# Patient Record
Sex: FEMALE | Race: WHITE | NOT HISPANIC OR LATINO | Employment: FULL TIME | URBAN - METROPOLITAN AREA
[De-identification: names, ages, dates, MRNs, and addresses within clinical notes are randomized per-mention and may not be internally consistent; named-entity substitution may affect disease eponyms.]

---

## 2017-01-11 ENCOUNTER — HOSPITAL ENCOUNTER (OUTPATIENT)
Dept: FAMILY MEDICINE CLINIC | Facility: CLINIC | Age: 27
Setting detail: SPECIMEN
Discharge: HOME | End: 2017-01-11
Attending: NURSE PRACTITIONER | Admitting: NURSE PRACTITIONER

## 2017-01-11 LAB
ALBUMIN SERPL-MCNC: 3.6 G/DL (ref 3.5–4.8)
ALBUMIN/GLOB SERPL: 1.3 {RATIO} (ref 1–1.7)
ALP SERPL-CCNC: 73 IU/L (ref 32–91)
ALT SERPL-CCNC: 14 IU/L (ref 14–54)
ANION GAP SERPL CALC-SCNC: 12.2 MMOL/L (ref 10–20)
AST SERPL-CCNC: 17 IU/L (ref 15–41)
BILIRUB SERPL-MCNC: 0.3 MG/DL (ref 0.3–1.2)
BUN SERPL-MCNC: 9 MG/DL (ref 8–20)
BUN/CREAT SERPL: 12.9 (ref 5.4–26.2)
CALCIUM SERPL-MCNC: 8.9 MG/DL (ref 8.9–10.3)
CHLORIDE SERPL-SCNC: 108 MMOL/L (ref 101–111)
CHOLEST SERPL-MCNC: 127 MG/DL
CHOLEST/HDLC SERPL: 2.5 {RATIO}
CONV CO2: 22 MMOL/L (ref 22–32)
CONV LDL CHOLESTEROL DIRECT: 67 MG/DL (ref 0–100)
CONV TOTAL PROTEIN: 6.4 G/DL (ref 6.1–7.9)
CREAT UR-MCNC: 0.7 MG/DL (ref 0.4–1)
GLOBULIN UR ELPH-MCNC: 2.8 G/DL (ref 2.5–3.8)
GLUCOSE SERPL-MCNC: 92 MG/DL (ref 65–99)
HDLC SERPL-MCNC: 50 MG/DL
LDLC/HDLC SERPL: 1.3 {RATIO}
LIPID INTERPRETATION: NORMAL
POTASSIUM SERPL-SCNC: 4.2 MMOL/L (ref 3.6–5.1)
SODIUM SERPL-SCNC: 138 MMOL/L (ref 136–144)
T4 FREE SERPL-MCNC: 0.71 NG/DL (ref 0.58–1.64)
TRIGL SERPL-MCNC: 49 MG/DL
TSH SERPL-ACNC: 1.72 UIU/ML (ref 0.34–5.6)
VLDLC SERPL CALC-MCNC: 10 MG/DL

## 2018-11-07 ENCOUNTER — HOSPITAL ENCOUNTER (OUTPATIENT)
Dept: FAMILY MEDICINE CLINIC | Facility: CLINIC | Age: 28
Setting detail: SPECIMEN
Discharge: HOME | End: 2018-11-07
Attending: FAMILY MEDICINE | Admitting: FAMILY MEDICINE

## 2018-11-07 LAB
BASOPHILS # BLD AUTO: 0 10*3/UL (ref 0–0.2)
BASOPHILS NFR BLD AUTO: 1 % (ref 0–2)
DIFFERENTIAL METHOD BLD: (no result)
EOSINOPHIL # BLD AUTO: 0 10*3/UL (ref 0–0.3)
EOSINOPHIL # BLD AUTO: 1 % (ref 0–3)
ERYTHROCYTE [DISTWIDTH] IN BLOOD BY AUTOMATED COUNT: 13 % (ref 11.5–14.5)
HCT VFR BLD AUTO: 39 % (ref 35–49)
HGB BLD-MCNC: 13.1 G/DL (ref 12–15)
LYMPHOCYTES # BLD AUTO: 1.4 10*3/UL (ref 0.8–4.8)
LYMPHOCYTES NFR BLD AUTO: 21 % (ref 18–42)
MCH RBC QN AUTO: 29.5 PG (ref 26–32)
MCHC RBC AUTO-ENTMCNC: 33.6 G/DL (ref 32–36)
MCV RBC AUTO: 87.9 FL (ref 80–94)
MONOCYTES # BLD AUTO: 0.5 10*3/UL (ref 0.1–1.3)
MONOCYTES NFR BLD AUTO: 7 % (ref 2–11)
NEUTROPHILS # BLD AUTO: 4.6 10*3/UL (ref 2.3–8.6)
NEUTROPHILS NFR BLD AUTO: 70 % (ref 50–75)
NRBC BLD AUTO-RTO: 0 /100{WBCS}
NRBC/RBC NFR BLD MANUAL: 0 10*3/UL
PLATELET # BLD AUTO: 240 10*3/UL (ref 150–450)
PMV BLD AUTO: 8.8 FL (ref 7.4–10.4)
RBC # BLD AUTO: 4.44 10*6/UL (ref 4–5.4)
WBC # BLD AUTO: 6.6 10*3/UL (ref 4.5–11.5)

## 2019-04-09 ENCOUNTER — OFFICE VISIT (OUTPATIENT)
Dept: RETAIL CLINIC | Facility: CLINIC | Age: 29
End: 2019-04-09

## 2019-04-09 VITALS
SYSTOLIC BLOOD PRESSURE: 106 MMHG | TEMPERATURE: 98.3 F | DIASTOLIC BLOOD PRESSURE: 62 MMHG | HEART RATE: 85 BPM | OXYGEN SATURATION: 97 % | RESPIRATION RATE: 18 BRPM

## 2019-04-09 DIAGNOSIS — Z86.19 HISTORY OF CANDIDIASIS: ICD-10-CM

## 2019-04-09 DIAGNOSIS — J01.00 ACUTE NON-RECURRENT MAXILLARY SINUSITIS: Primary | ICD-10-CM

## 2019-04-09 PROCEDURE — 99203 OFFICE O/P NEW LOW 30 MIN: CPT | Performed by: NURSE PRACTITIONER

## 2019-04-09 RX ORDER — FLUCONAZOLE 150 MG/1
TABLET ORAL
Qty: 1 TABLET | Refills: 0 | Status: SHIPPED | OUTPATIENT
Start: 2019-04-09 | End: 2021-06-30

## 2019-04-09 RX ORDER — AMOXICILLIN 875 MG/1
875 TABLET, COATED ORAL 2 TIMES DAILY
Qty: 14 TABLET | Refills: 0 | Status: SHIPPED | OUTPATIENT
Start: 2019-04-09 | End: 2019-04-16

## 2019-04-09 NOTE — PATIENT INSTRUCTIONS

## 2019-04-09 NOTE — PROGRESS NOTES
Subjective   Carmelita Simmons is a 28 y.o. female.     Sinusitis   This is a new problem. The current episode started 1 to 4 weeks ago. The problem has been gradually worsening since onset. There has been no fever. The pain is mild. Associated symptoms include chills, congestion (worsening ), coughing (small amount of sputum production, improving ), ear pain, headaches, sinus pressure, sneezing and a sore throat. Pertinent negatives include no diaphoresis, hoarse voice, neck pain, shortness of breath or swollen glands. Treatments tried: robitussin, mucinex-DM, flonase, claritin. The treatment provided no relief.       The following portions of the patient's history were reviewed and updated as appropriate: allergies, current medications, past medical history, past social history, past surgical history and problem list.    Review of Systems   Constitutional: Positive for chills and fatigue. Negative for appetite change, diaphoresis and fever.   HENT: Positive for congestion (worsening ), ear pain, hearing loss (muffled ), postnasal drip, rhinorrhea, sinus pressure, sinus pain, sneezing and sore throat. Negative for dental problem, ear discharge, hoarse voice, mouth sores, nosebleeds, trouble swallowing and voice change.    Eyes: Negative for redness and itching.   Respiratory: Positive for cough (small amount of sputum production, improving ). Negative for chest tightness, shortness of breath, wheezing and stridor.    Cardiovascular: Negative for chest pain.   Gastrointestinal: Negative for diarrhea, nausea and vomiting.   Musculoskeletal: Negative for myalgias and neck pain.   Allergic/Immunologic: Positive for environmental allergies. Negative for immunocompromised state.   Neurological: Positive for headaches. Negative for dizziness and syncope.       Objective   Physical Exam   Constitutional: She is oriented to person, place, and time. She appears well-developed and well-nourished. She is cooperative.  Non-toxic  appearance. She does not appear ill. No distress.   HENT:   Right Ear: External ear and ear canal normal. Tympanic membrane is retracted. Tympanic membrane is not perforated and not erythematous. A middle ear effusion is present.   Left Ear: External ear and ear canal normal. Tympanic membrane is retracted. Tympanic membrane is not perforated and not erythematous. A middle ear effusion is present.   Nose: Mucosal edema and rhinorrhea present. Right sinus exhibits maxillary sinus tenderness. Right sinus exhibits no frontal sinus tenderness. Left sinus exhibits maxillary sinus tenderness. Left sinus exhibits no frontal sinus tenderness.   Mouth/Throat: Uvula is midline and mucous membranes are normal. No oral lesions. No uvula swelling. Posterior oropharyngeal erythema present. No oropharyngeal exudate or posterior oropharyngeal edema. Tonsils are 2+ on the right. Tonsils are 2+ on the left. No tonsillar exudate.   Eyes: Conjunctivae and lids are normal.   Cardiovascular: Normal rate, regular rhythm, S1 normal and S2 normal.   Pulmonary/Chest: Effort normal and breath sounds normal.   Lymphadenopathy:     She has no cervical adenopathy.   Neurological: She is alert and oriented to person, place, and time.   Skin: Skin is warm and dry. She is not diaphoretic. No pallor.   Vitals reviewed.      Assessment/Plan   Carmelita was seen today for sinusitis.    Diagnoses and all orders for this visit:    Acute non-recurrent maxillary sinusitis  -     amoxicillin (AMOXIL) 875 MG tablet; Take 1 tablet by mouth 2 (Two) Times a Day for 7 days.    History of candidiasis  -     fluconazole (DIFLUCAN) 150 MG tablet; Take 1 tablet by mouth once PRN vaginal itching/redness or increased vaginal discharge occurs          -     Continue with mucinex-DM, Claritin and flonase        -     Follow up with PCP for persistent symptoms or UC/ER for worsening symptoms

## 2021-06-30 ENCOUNTER — OFFICE VISIT (OUTPATIENT)
Dept: FAMILY MEDICINE CLINIC | Facility: CLINIC | Age: 31
End: 2021-06-30

## 2021-06-30 VITALS
HEIGHT: 61 IN | OXYGEN SATURATION: 97 % | HEART RATE: 64 BPM | BODY MASS INDEX: 24.92 KG/M2 | SYSTOLIC BLOOD PRESSURE: 93 MMHG | DIASTOLIC BLOOD PRESSURE: 61 MMHG | WEIGHT: 132 LBS | TEMPERATURE: 98 F

## 2021-06-30 DIAGNOSIS — M25.521 RIGHT ELBOW PAIN: Primary | ICD-10-CM

## 2021-06-30 PROBLEM — H53.9 VISUAL DISTURBANCE: Status: ACTIVE | Noted: 2019-04-05

## 2021-06-30 PROBLEM — H53.139 SUDDEN VISUAL LOSS: Status: ACTIVE | Noted: 2018-11-07

## 2021-06-30 PROBLEM — R51.9 HEADACHE: Status: ACTIVE | Noted: 2018-11-07

## 2021-06-30 PROBLEM — J01.90 SINUSITIS, ACUTE: Status: ACTIVE | Noted: 2017-04-28

## 2021-06-30 PROCEDURE — 99213 OFFICE O/P EST LOW 20 MIN: CPT | Performed by: FAMILY MEDICINE

## 2021-06-30 RX ORDER — PRENATAL VIT NO.126/IRON/FOLIC 28MG-0.8MG
1 TABLET ORAL DAILY
COMMUNITY

## 2021-06-30 RX ORDER — LORATADINE 10 MG/1
1 CAPSULE, LIQUID FILLED ORAL DAILY
COMMUNITY
End: 2022-04-21

## 2021-06-30 NOTE — PROGRESS NOTES
"Subjective   Carmelita BAUTISTA is a 30 y.o. female.     Comes in with complaints of right elbow pain since Sunday night  She denies any trauma  Went to [facility] on Saturday  Just ovulated and is actively trying to get pregnant  She is on prenatal vitamins but is not doing any Clomid or anything similar to that       The following portions of the patient's history were reviewed and updated as appropriate: allergies, current medications, past family history, past medical history, past social history, past surgical history, and problem list.  Past Medical History:   Diagnosis Date   • Allergic    • HPV in female    • PCOS (polycystic ovarian syndrome)      Past Surgical History:   Procedure Laterality Date   • HEAD/NECK LESION/CYST EXCISION Left     infancy     History reviewed. No pertinent family history.  Social History     Socioeconomic History   • Marital status:      Spouse name: Not on file   • Number of children: Not on file   • Years of education: Not on file   • Highest education level: Not on file   Tobacco Use   • Smoking status: Former Smoker     Types: Cigarettes   • Smokeless tobacco: Never Used   Vaping Use   • Vaping Use: Never used   Substance and Sexual Activity   • Alcohol use: Yes     Comment: 2-3x week at most   • Drug use: Defer   • Sexual activity: Defer         Current Outpatient Medications:   •  Loratadine (Claritin) 10 MG capsule, Take 1 capsule by mouth Daily., Disp: , Rfl:   •  prenatal vitamin (prenatal, CLASSIC, vitamin) tablet, Take 1 tablet by mouth Daily., Disp: , Rfl:     Review of Systems   Constitutional: Negative.    Musculoskeletal: Positive for arthralgias. Negative for joint swelling.   Skin: Negative for rash and bruise.   Neurological: Negative for weakness and numbness.     BP 93/61 (BP Location: Left arm, Patient Position: Sitting, Cuff Size: Adult)   Pulse 64   Temp 98 °F (36.7 °C) (Temporal)   Ht 154.9 cm (61\")   Wt 59.9 kg (132 lb)   SpO2 97%   " Breastfeeding No   BMI 24.94 kg/m²       Objective   Physical Exam  Vitals and nursing note reviewed.   Constitutional:       Appearance: Normal appearance. She is normal weight.   HENT:      Head: Normocephalic and atraumatic.   Cardiovascular:      Pulses:           Radial pulses are 2+ on the right side.   Musculoskeletal:      Right elbow: Normal.      Right forearm: Normal.      Right wrist: Normal.   Skin:     General: Skin is warm and dry.      Comments: No ecchymosis or erythema involving the right elbow   Neurological:      Mental Status: She is alert.      Motor: Motor function is intact.      Comments:  strength is 5 out of 5           Assessment/Plan   Problems Addressed this Visit     None      Visit Diagnoses     Right elbow pain    -  Primary      Diagnoses       Codes Comments    Right elbow pain    -  Primary ICD-10-CM: M25.521  ICD-9-CM: 719.42         Reassured  I suspect is related to mild tendinitis  She is actively trying to conceive and just ovulated  This is a very new problem so we will hold off on starting any new medications at this time  She will try to limit her use of her arm and not start any new routines such as exercise or house projects like washing windows or painting walls  In 2 to 3 weeks if she still having pain or has developed a new problem she will let me know and I will start her on medication at that time

## 2021-06-30 NOTE — PATIENT INSTRUCTIONS
Rest - no new activites  Apply ice or warm compresses  If the pain persists more than 2 weeks, let me know and I will send medication

## 2021-09-23 ENCOUNTER — OFFICE VISIT (OUTPATIENT)
Dept: FAMILY MEDICINE CLINIC | Facility: CLINIC | Age: 31
End: 2021-09-23

## 2021-09-23 VITALS
DIASTOLIC BLOOD PRESSURE: 63 MMHG | SYSTOLIC BLOOD PRESSURE: 90 MMHG | WEIGHT: 134 LBS | OXYGEN SATURATION: 97 % | HEART RATE: 78 BPM | TEMPERATURE: 98.4 F | BODY MASS INDEX: 25.32 KG/M2

## 2021-09-23 DIAGNOSIS — S46.812A TRAPEZIUS MUSCLE STRAIN, LEFT, INITIAL ENCOUNTER: Primary | ICD-10-CM

## 2021-09-23 PROCEDURE — 99213 OFFICE O/P EST LOW 20 MIN: CPT | Performed by: FAMILY MEDICINE

## 2021-09-23 RX ORDER — LETROZOLE 2.5 MG/1
TABLET, FILM COATED ORAL
COMMUNITY

## 2021-09-23 RX ORDER — PREDNISONE 10 MG/1
TABLET ORAL
Qty: 40 TABLET | Refills: 0 | Status: SHIPPED | OUTPATIENT
Start: 2021-09-23 | End: 2022-04-21

## 2021-09-23 NOTE — PROGRESS NOTES
Subjective   Carmelita Daniel is a 30 y.o. female.     Carmelita Daniel is in for some acute left shoulder pain.  She was stretching a couple of days ago when she felt something very painful in the back of her left shoulder.  It has left her with limited mobility and significant discomfort.  She received a deep tissue massage which helped temporarily but did not completely alleviate the problem.  She has been using over-the-counter Tylenol with no benefit.  There is no history of chest pain or dyspnea.  She is not getting great sleep due to the pain. There is no history of issue with present medication.       Back Pain           BP 90/63 (BP Location: Left arm, Patient Position: Sitting, Cuff Size: Large Adult)   Pulse 78   Temp 98.4 °F (36.9 °C) (Temporal)   Wt 60.8 kg (134 lb)   SpO2 97%   BMI 25.32 kg/m²       Chief Complaint   Patient presents with   • Back Pain   • Shoulder Pain     and neck - limitd mobility - started tweeks around Tuesday            Current Outpatient Medications:   •  letrozole (FEMARA) 2.5 MG tablet, , Disp: , Rfl:   •  Loratadine (Claritin) 10 MG capsule, Take 1 capsule by mouth Daily., Disp: , Rfl:   •  prenatal vitamin (prenatal, CLASSIC, vitamin) tablet, Take 1 tablet by mouth Daily., Disp: , Rfl:   •  predniSONE (DELTASONE) 10 MG tablet, Take 4 tabs po daily x 4 d, then 3 tabs po daily x 4 d , then 2 tabs po daily x 4 d, then 1 tab po daily x 4 d, Disp: 40 tablet, Rfl: 0        The following portions of the patient's history were reviewed and updated as appropriate: allergies, current medications, past family history, past medical history, past social history, past surgical history, and problem list.    Review of Systems   Unable to perform ROS: Acuity of condition   Musculoskeletal: Positive for back pain.       Objective   Physical Exam  Vitals and nursing note reviewed.   Musculoskeletal:      Cervical back: Neck supple.      Comments: Some spasm and pain in the left trapezius  muscle with very limited mobility in the left shoulder as a result and in the neck as a result   Lymphadenopathy:      Cervical: No cervical adenopathy.   Skin:     Findings: No bruising or rash.   Neurological:      General: No focal deficit present.      Mental Status: She is alert and oriented to person, place, and time.           Assessment/Plan   Problems Addressed this Visit     None      Visit Diagnoses     Trapezius muscle strain, left, initial encounter    -  Primary    Relevant Orders    Ambulatory Referral to Physical Therapy Evaluate and treat (Completed)      Diagnoses       Codes Comments    Trapezius muscle strain, left, initial encounter    -  Primary ICD-10-CM: S46.812A  ICD-9-CM: 840.8         I will refer to physical therapy  I will put her on some steroids  I showed her some stretches that she could do after she has used heat which I want her to apply liberally throughout the day  I asked her to ice it for acute flare of pain  She is to call if the pain is not resolving

## 2021-10-07 ENCOUNTER — OFFICE VISIT (OUTPATIENT)
Dept: FAMILY MEDICINE CLINIC | Facility: CLINIC | Age: 31
End: 2021-10-07

## 2021-10-07 VITALS
SYSTOLIC BLOOD PRESSURE: 135 MMHG | DIASTOLIC BLOOD PRESSURE: 85 MMHG | WEIGHT: 135 LBS | OXYGEN SATURATION: 100 % | HEART RATE: 77 BPM | BODY MASS INDEX: 25.49 KG/M2 | HEIGHT: 61 IN

## 2021-10-07 DIAGNOSIS — Z00.00 ENCOUNTER FOR ANNUAL GENERAL MEDICAL EXAMINATION WITHOUT ABNORMAL FINDINGS IN ADULT: Primary | ICD-10-CM

## 2021-10-07 PROCEDURE — 99395 PREV VISIT EST AGE 18-39: CPT | Performed by: FAMILY MEDICINE

## 2021-10-07 NOTE — PROGRESS NOTES
Subjective   Carmelita Daniel is a 30 y.o. female.     Here for CPE  Doing steroids for left shoulder pain  Starts PT next week  father recently diagnosed with type 2 diabetes  Feels well and has no other complaints  She is actively trying to conceive       The following portions of the patient's history were reviewed and updated as appropriate: allergies, current medications, past family history, past medical history, past social history, past surgical history, and problem list.  Past Medical History:   Diagnosis Date   • Allergic    • HPV in female    • PCOS (polycystic ovarian syndrome)      Past Surgical History:   Procedure Laterality Date   • HEAD/NECK LESION/CYST EXCISION Left     infancy     History reviewed. No pertinent family history.  Social History     Socioeconomic History   • Marital status:      Spouse name: Not on file   • Number of children: Not on file   • Years of education: Not on file   • Highest education level: Not on file   Tobacco Use   • Smoking status: Former Smoker     Types: Cigarettes   • Smokeless tobacco: Never Used   Vaping Use   • Vaping Use: Never used   Substance and Sexual Activity   • Alcohol use: Yes     Comment: 2-3x week at most   • Drug use: Defer   • Sexual activity: Defer         Current Outpatient Medications:   •  letrozole (FEMARA) 2.5 MG tablet, , Disp: , Rfl:   •  Loratadine (Claritin) 10 MG capsule, Take 1 capsule by mouth Daily., Disp: , Rfl:   •  predniSONE (DELTASONE) 10 MG tablet, Take 4 tabs po daily x 4 d, then 3 tabs po daily x 4 d , then 2 tabs po daily x 4 d, then 1 tab po daily x 4 d, Disp: 40 tablet, Rfl: 0  •  prenatal vitamin (prenatal, CLASSIC, vitamin) tablet, Take 1 tablet by mouth Daily., Disp: , Rfl:     Review of Systems   Constitutional: Negative.    HENT: Negative.    Respiratory: Negative.    Cardiovascular: Negative.    Gastrointestinal: Negative.    Endocrine: Negative.    Genitourinary: Negative.    Musculoskeletal: Negative.    Skin:  "Negative.    Neurological: Negative.    Hematological: Negative.    Psychiatric/Behavioral: Negative.      /85 (BP Location: Left arm, Patient Position: Sitting, Cuff Size: Adult)   Pulse 77   Ht 154.9 cm (61\")   Wt 61.2 kg (135 lb)   SpO2 100%   BMI 25.51 kg/m²       Objective   Physical Exam  Vitals and nursing note reviewed.   Constitutional:       Appearance: Normal appearance. She is well-developed and well-groomed.   HENT:      Head: Normocephalic and atraumatic.      Right Ear: Tympanic membrane, ear canal and external ear normal.      Left Ear: Tympanic membrane, ear canal and external ear normal.      Nose: Nose normal.      Mouth/Throat:      Mouth: Mucous membranes are moist.      Pharynx: Oropharynx is clear.   Eyes:      Extraocular Movements: Extraocular movements intact.      Conjunctiva/sclera: Conjunctivae normal.      Pupils: Pupils are equal, round, and reactive to light.   Neck:      Thyroid: No thyromegaly.      Vascular: No carotid bruit.   Cardiovascular:      Rate and Rhythm: Normal rate and regular rhythm.      Pulses: Normal pulses.      Heart sounds: Normal heart sounds.   Pulmonary:      Effort: Pulmonary effort is normal.      Breath sounds: Normal breath sounds.   Abdominal:      General: Abdomen is flat. Bowel sounds are normal.      Palpations: Abdomen is soft. There is no hepatomegaly, splenomegaly or mass.      Tenderness: There is no abdominal tenderness.      Hernia: No hernia is present.   Musculoskeletal:      Cervical back: Normal range of motion and neck supple.      Right lower leg: No edema.      Left lower leg: No edema.   Lymphadenopathy:      Cervical: No cervical adenopathy.      Upper Body:      Right upper body: No supraclavicular or axillary adenopathy.      Left upper body: No supraclavicular or axillary adenopathy.   Skin:     General: Skin is warm and dry.      Findings: No lesion or rash.   Neurological:      General: No focal deficit present.      " Mental Status: She is alert.      Motor: Motor function is intact.      Deep Tendon Reflexes: Reflexes are normal and symmetric.   Psychiatric:         Attention and Perception: Attention normal.         Mood and Affect: Mood normal.         Behavior: Behavior is cooperative.           Assessment/Plan   Problems Addressed this Visit     None      Visit Diagnoses     Encounter for annual general medical examination without abnormal findings in adult    -  Primary    Relevant Orders    Comprehensive Metabolic Panel    Lipid Panel      Diagnoses       Codes Comments    Encounter for annual general medical examination without abnormal findings in adult    -  Primary ICD-10-CM: Z00.00  ICD-9-CM: V70.0           Labs were ordered  She will get her flu shot at work  She will check on the status of her Tdap but should she conceive she will get a Tdap after delivery

## 2021-10-26 ENCOUNTER — LAB (OUTPATIENT)
Dept: FAMILY MEDICINE CLINIC | Facility: CLINIC | Age: 31
End: 2021-10-26

## 2021-10-26 DIAGNOSIS — Z00.00 ENCOUNTER FOR ANNUAL GENERAL MEDICAL EXAMINATION WITHOUT ABNORMAL FINDINGS IN ADULT: ICD-10-CM

## 2021-10-26 LAB
ALBUMIN SERPL-MCNC: 4.4 G/DL (ref 3.5–5.2)
ALBUMIN/GLOB SERPL: 2.1 G/DL
ALP SERPL-CCNC: 73 U/L (ref 39–117)
ALT SERPL W P-5'-P-CCNC: 13 U/L (ref 1–33)
ANION GAP SERPL CALCULATED.3IONS-SCNC: 6.8 MMOL/L (ref 5–15)
AST SERPL-CCNC: 15 U/L (ref 1–32)
BILIRUB SERPL-MCNC: 0.2 MG/DL (ref 0–1.2)
BUN SERPL-MCNC: 9 MG/DL (ref 6–20)
BUN/CREAT SERPL: 12.9 (ref 7–25)
CALCIUM SPEC-SCNC: 8.8 MG/DL (ref 8.6–10.5)
CHLORIDE SERPL-SCNC: 106 MMOL/L (ref 98–107)
CHOLEST SERPL-MCNC: 118 MG/DL (ref 0–200)
CO2 SERPL-SCNC: 27.2 MMOL/L (ref 22–29)
CREAT SERPL-MCNC: 0.7 MG/DL (ref 0.57–1)
GFR SERPL CREATININE-BSD FRML MDRD: 98 ML/MIN/1.73
GLOBULIN UR ELPH-MCNC: 2.1 GM/DL
GLUCOSE SERPL-MCNC: 84 MG/DL (ref 65–99)
HDLC SERPL-MCNC: 47 MG/DL (ref 40–60)
LDLC SERPL CALC-MCNC: 57 MG/DL (ref 0–100)
LDLC/HDLC SERPL: 1.23 {RATIO}
POTASSIUM SERPL-SCNC: 4.1 MMOL/L (ref 3.5–5.2)
PROT SERPL-MCNC: 6.5 G/DL (ref 6–8.5)
SODIUM SERPL-SCNC: 140 MMOL/L (ref 136–145)
TRIGL SERPL-MCNC: 66 MG/DL (ref 0–150)
VLDLC SERPL-MCNC: 14 MG/DL (ref 5–40)

## 2021-10-26 PROCEDURE — 80061 LIPID PANEL: CPT | Performed by: FAMILY MEDICINE

## 2021-10-26 PROCEDURE — 36415 COLL VENOUS BLD VENIPUNCTURE: CPT | Performed by: FAMILY MEDICINE

## 2021-10-26 PROCEDURE — 80053 COMPREHEN METABOLIC PANEL: CPT | Performed by: FAMILY MEDICINE

## 2022-10-11 ENCOUNTER — LAB (OUTPATIENT)
Dept: FAMILY MEDICINE CLINIC | Facility: CLINIC | Age: 32
End: 2022-10-11

## 2022-10-11 ENCOUNTER — OFFICE VISIT (OUTPATIENT)
Dept: FAMILY MEDICINE CLINIC | Facility: CLINIC | Age: 32
End: 2022-10-11

## 2022-10-11 VITALS
OXYGEN SATURATION: 100 % | HEIGHT: 61 IN | WEIGHT: 129 LBS | TEMPERATURE: 98.6 F | HEART RATE: 66 BPM | BODY MASS INDEX: 24.35 KG/M2 | SYSTOLIC BLOOD PRESSURE: 94 MMHG | DIASTOLIC BLOOD PRESSURE: 67 MMHG

## 2022-10-11 DIAGNOSIS — Z00.00 ENCOUNTER FOR GENERAL ADULT MEDICAL EXAMINATION W/O ABNORMAL FINDINGS: Primary | ICD-10-CM

## 2022-10-11 DIAGNOSIS — Z00.00 ENCOUNTER FOR GENERAL ADULT MEDICAL EXAMINATION W/O ABNORMAL FINDINGS: ICD-10-CM

## 2022-10-11 LAB
ALBUMIN SERPL-MCNC: 4.7 G/DL (ref 3.5–5.2)
ALBUMIN/GLOB SERPL: 1.8 G/DL
ALP SERPL-CCNC: 84 U/L (ref 39–117)
ALT SERPL W P-5'-P-CCNC: 12 U/L (ref 1–33)
ANION GAP SERPL CALCULATED.3IONS-SCNC: 11 MMOL/L (ref 5–15)
AST SERPL-CCNC: 17 U/L (ref 1–32)
BILIRUB SERPL-MCNC: 0.2 MG/DL (ref 0–1.2)
BUN SERPL-MCNC: 13 MG/DL (ref 6–20)
BUN/CREAT SERPL: 17.8 (ref 7–25)
CALCIUM SPEC-SCNC: 9.2 MG/DL (ref 8.6–10.5)
CHLORIDE SERPL-SCNC: 103 MMOL/L (ref 98–107)
CHOLEST SERPL-MCNC: 123 MG/DL (ref 0–200)
CO2 SERPL-SCNC: 25 MMOL/L (ref 22–29)
CREAT SERPL-MCNC: 0.73 MG/DL (ref 0.57–1)
EGFRCR SERPLBLD CKD-EPI 2021: 112.9 ML/MIN/1.73
GLOBULIN UR ELPH-MCNC: 2.6 GM/DL
GLUCOSE SERPL-MCNC: 85 MG/DL (ref 65–99)
HBA1C MFR BLD: 5.3 % (ref 3.5–5.6)
HDLC SERPL-MCNC: 47 MG/DL (ref 40–60)
LDLC SERPL CALC-MCNC: 67 MG/DL (ref 0–100)
LDLC/HDLC SERPL: 1.49 {RATIO}
POTASSIUM SERPL-SCNC: 4.6 MMOL/L (ref 3.5–5.2)
PROT SERPL-MCNC: 7.3 G/DL (ref 6–8.5)
SODIUM SERPL-SCNC: 139 MMOL/L (ref 136–145)
TRIGL SERPL-MCNC: 30 MG/DL (ref 0–150)
VLDLC SERPL-MCNC: 9 MG/DL (ref 5–40)

## 2022-10-11 PROCEDURE — 83036 HEMOGLOBIN GLYCOSYLATED A1C: CPT | Performed by: FAMILY MEDICINE

## 2022-10-11 PROCEDURE — 36415 COLL VENOUS BLD VENIPUNCTURE: CPT | Performed by: FAMILY MEDICINE

## 2022-10-11 PROCEDURE — 99395 PREV VISIT EST AGE 18-39: CPT | Performed by: FAMILY MEDICINE

## 2022-10-11 PROCEDURE — 80061 LIPID PANEL: CPT | Performed by: FAMILY MEDICINE

## 2022-10-11 PROCEDURE — 80053 COMPREHEN METABOLIC PANEL: CPT | Performed by: FAMILY MEDICINE

## 2022-10-11 RX ORDER — MULTIVIT-MIN/IRON/FOLIC ACID/K 18-600-40
CAPSULE ORAL
COMMUNITY

## 2022-10-11 RX ORDER — OMEGA-3/DHA/EPA/FISH OIL 910-1400MG
CAPSULE ORAL
COMMUNITY

## 2022-10-11 RX ORDER — ACETYLCYSTEINE 600 MG
CAPSULE ORAL
COMMUNITY

## 2022-10-11 NOTE — PROGRESS NOTES
Subjective   Carmelita Daniel is a 31 y.o. female.     History of Present Illness  Here for cpe  Sees gyn  Sees reproductive endo  Just completed a session of IUI and awaiting confirmation of pregnancy       The following portions of the patient's history were reviewed and updated as appropriate: allergies, current medications, past family history, past medical history, past social history, past surgical history, and problem list.  Past Medical History:   Diagnosis Date   • Allergic    • History of medical problems Nov2021    PCOS-adrenal   • HPV in female    • PCOS (polycystic ovarian syndrome)      Past Surgical History:   Procedure Laterality Date   • HEAD/NECK LESION/CYST EXCISION Left     infancy     Family History   Problem Relation Age of Onset   • Arthritis Mother         Hip & shoulder   • Depression Mother    • Thyroid disease Mother         Niece born with hypothyroidism as well   • Arthritis Maternal Grandmother         Hands   • Vision loss Maternal Grandmother         Macular degeneration   • Diabetes Maternal Grandfather      Social History     Socioeconomic History   • Marital status:    Tobacco Use   • Smoking status: Former     Years: 8.00     Types: Cigarettes   • Smokeless tobacco: Never   • Tobacco comments:     Cessation 2015   Vaping Use   • Vaping Use: Never used   Substance and Sexual Activity   • Alcohol use: Yes     Alcohol/week: 1.0 standard drink     Types: 1 Cans of beer per week     Comment: At this time consumption is occasional   • Drug use: Never   • Sexual activity: Yes     Partners: Male     Birth control/protection: None     Comment: Trying to conceive         Current Outpatient Medications:   •  Acetylcysteine (Nac 600) capsule capsule, , Disp: , Rfl:   •  Cholecalciferol (Vitamin D) 50 MCG (2000 UT) capsule, , Disp: , Rfl:   •  Inositol-D Chiro-Inositol 2000-50 MG pack, , Disp: , Rfl:   •  letrozole (FEMARA) 2.5 MG tablet, , Disp: , Rfl:   •  levothyroxine (SYNTHROID,  "LEVOTHROID) 50 MCG tablet, , Disp: , Rfl:   •  Omega-3 1400 MG capsule, , Disp: , Rfl:   •  Ovidrel 250 MCG/0.5ML injection, INJECT 1 PREFILLED SYRINGE SUBCUTANEOUSLY AS DIRECTED BY PHYSICIAN FOR A SINGLE DOSE, Disp: , Rfl:   •  prenatal vitamin (prenatal, CLASSIC, vitamin) tablet, Take 1 tablet by mouth Daily., Disp: , Rfl:   •  Ubiquinol 200 MG capsule, , Disp: , Rfl:     Review of Systems   Constitutional: Negative.    HENT: Negative.    Respiratory: Negative.    Cardiovascular: Negative.    Gastrointestinal: Negative.    Genitourinary: Negative.    Musculoskeletal: Negative.    Skin: Negative.    Neurological: Negative.    Psychiatric/Behavioral: Negative.      BP 94/67 (BP Location: Left arm, Patient Position: Sitting, Cuff Size: Adult)   Pulse 66   Temp 98.6 °F (37 °C) (Temporal)   Ht 154.9 cm (61\")   Wt 58.5 kg (129 lb)   SpO2 100%   BMI 24.37 kg/m²       Objective   Physical Exam  Vitals and nursing note reviewed.   Constitutional:       Appearance: Normal appearance. She is well-developed and well-groomed.   HENT:      Head: Normocephalic and atraumatic.      Right Ear: Tympanic membrane, ear canal and external ear normal.      Left Ear: Tympanic membrane, ear canal and external ear normal.      Nose: Nose normal.      Mouth/Throat:      Mouth: Mucous membranes are moist.      Pharynx: Oropharynx is clear.   Eyes:      Extraocular Movements: Extraocular movements intact.      Conjunctiva/sclera: Conjunctivae normal.      Pupils: Pupils are equal, round, and reactive to light.   Neck:      Thyroid: No thyromegaly.      Vascular: No carotid bruit.   Cardiovascular:      Rate and Rhythm: Normal rate and regular rhythm.      Pulses: Normal pulses.      Heart sounds: Normal heart sounds.   Pulmonary:      Effort: Pulmonary effort is normal.      Breath sounds: Normal breath sounds.   Abdominal:      General: Abdomen is flat. Bowel sounds are normal.      Palpations: Abdomen is soft. There is no " hepatomegaly, splenomegaly or mass.      Tenderness: There is no abdominal tenderness.      Hernia: No hernia is present.   Musculoskeletal:      Cervical back: Normal range of motion and neck supple.      Right lower leg: No edema.      Left lower leg: No edema.   Lymphadenopathy:      Cervical: No cervical adenopathy.      Upper Body:      Right upper body: No supraclavicular adenopathy.      Left upper body: No supraclavicular adenopathy.   Skin:     General: Skin is warm and dry.      Findings: No lesion or rash.   Neurological:      General: No focal deficit present.      Mental Status: She is alert.      Motor: Motor function is intact.      Deep Tendon Reflexes: Reflexes are normal and symmetric.   Psychiatric:         Attention and Perception: Attention normal.         Mood and Affect: Mood normal.         Behavior: Behavior is cooperative.           Assessment & Plan   Problems Addressed this Visit        Health Encounters    Encounter for general adult medical examination w/o abnormal findings - Primary    Relevant Orders    Comprehensive Metabolic Panel    Lipid Panel    Hemoglobin A1c   Diagnoses       Codes Comments    Encounter for general adult medical examination w/o abnormal findings    -  Primary ICD-10-CM: Z00.00  ICD-9-CM: V70.9         She will await the results of the pregnancy test and then talk to her reproductive endocrinologist regarding whether or not she should have her flu shot now if its negative I will wait till the third trimester if it is positive.  I will hold off on giving her a Tdap at this time as well.  She was counseled on the need to continue to be physically active, stay hydrated, and be sure she is taking a multivitamin with folate.  I will see her back in a year.  CMP, lipid, A1c were ordered

## 2023-08-25 ENCOUNTER — HOSPITAL ENCOUNTER (EMERGENCY)
Facility: HOSPITAL | Age: 33
Discharge: HOME | End: 2023-08-25
Attending: EMERGENCY MEDICINE
Payer: COMMERCIAL

## 2023-08-25 ENCOUNTER — APPOINTMENT (OUTPATIENT)
Dept: MRI IMAGING | Facility: HOSPITAL | Age: 33
End: 2023-08-25
Payer: COMMERCIAL

## 2023-08-25 VITALS
WEIGHT: 146.83 LBS | TEMPERATURE: 98.2 F | DIASTOLIC BLOOD PRESSURE: 70 MMHG | HEIGHT: 61 IN | SYSTOLIC BLOOD PRESSURE: 101 MMHG | RESPIRATION RATE: 16 BRPM | OXYGEN SATURATION: 98 % | HEART RATE: 86 BPM | BODY MASS INDEX: 27.72 KG/M2

## 2023-08-25 DIAGNOSIS — H53.9 VISUAL DISTURBANCE: Primary | ICD-10-CM

## 2023-08-25 DIAGNOSIS — R82.71 BACTERIURIA IN PREGNANCY: ICD-10-CM

## 2023-08-25 DIAGNOSIS — O99.891 BACTERIURIA IN PREGNANCY: ICD-10-CM

## 2023-08-25 LAB
ALBUMIN SERPL-MCNC: 3.8 G/DL (ref 3.5–5.2)
ALBUMIN/GLOB SERPL: 1.4 G/DL
ALP SERPL-CCNC: 79 U/L (ref 39–117)
ALT SERPL W P-5'-P-CCNC: 22 U/L (ref 1–33)
ANION GAP SERPL CALCULATED.3IONS-SCNC: 12 MMOL/L (ref 5–15)
AST SERPL-CCNC: 18 U/L (ref 1–32)
BACTERIA UR QL AUTO: ABNORMAL /HPF
BASOPHILS # BLD AUTO: 0 10*3/MM3 (ref 0–0.2)
BASOPHILS NFR BLD AUTO: 0.3 % (ref 0–1.5)
BILIRUB SERPL-MCNC: 0.2 MG/DL (ref 0–1.2)
BILIRUB UR QL STRIP: NEGATIVE
BUN SERPL-MCNC: 8 MG/DL (ref 6–20)
BUN/CREAT SERPL: 15.1 (ref 7–25)
CALCIUM SPEC-SCNC: 8.8 MG/DL (ref 8.6–10.5)
CHLORIDE SERPL-SCNC: 104 MMOL/L (ref 98–107)
CLARITY UR: CLEAR
CO2 SERPL-SCNC: 21 MMOL/L (ref 22–29)
COLOR UR: YELLOW
CREAT SERPL-MCNC: 0.53 MG/DL (ref 0.57–1)
DEPRECATED RDW RBC AUTO: 45.9 FL (ref 37–54)
EGFRCR SERPLBLD CKD-EPI 2021: 126.2 ML/MIN/1.73
EOSINOPHIL # BLD AUTO: 0.1 10*3/MM3 (ref 0–0.4)
EOSINOPHIL NFR BLD AUTO: 0.6 % (ref 0.3–6.2)
ERYTHROCYTE [DISTWIDTH] IN BLOOD BY AUTOMATED COUNT: 14.1 % (ref 12.3–15.4)
GLOBULIN UR ELPH-MCNC: 2.7 GM/DL
GLUCOSE SERPL-MCNC: 84 MG/DL (ref 65–99)
GLUCOSE UR STRIP-MCNC: NEGATIVE MG/DL
HCT VFR BLD AUTO: 37.4 % (ref 34–46.6)
HGB BLD-MCNC: 12.4 G/DL (ref 12–15.9)
HGB UR QL STRIP.AUTO: NEGATIVE
HYALINE CASTS UR QL AUTO: ABNORMAL /LPF
KETONES UR QL STRIP: ABNORMAL
LEUKOCYTE ESTERASE UR QL STRIP.AUTO: ABNORMAL
LYMPHOCYTES # BLD AUTO: 1.8 10*3/MM3 (ref 0.7–3.1)
LYMPHOCYTES NFR BLD AUTO: 18.1 % (ref 19.6–45.3)
MCH RBC QN AUTO: 29.9 PG (ref 26.6–33)
MCHC RBC AUTO-ENTMCNC: 33.3 G/DL (ref 31.5–35.7)
MCV RBC AUTO: 89.9 FL (ref 79–97)
MONOCYTES # BLD AUTO: 0.6 10*3/MM3 (ref 0.1–0.9)
MONOCYTES NFR BLD AUTO: 6.1 % (ref 5–12)
NEUTROPHILS NFR BLD AUTO: 7.5 10*3/MM3 (ref 1.7–7)
NEUTROPHILS NFR BLD AUTO: 74.9 % (ref 42.7–76)
NITRITE UR QL STRIP: NEGATIVE
NRBC BLD AUTO-RTO: 0 /100 WBC (ref 0–0.2)
PH UR STRIP.AUTO: 6 [PH] (ref 5–8)
PLATELET # BLD AUTO: 203 10*3/MM3 (ref 140–450)
PMV BLD AUTO: 8.7 FL (ref 6–12)
POTASSIUM SERPL-SCNC: 4.2 MMOL/L (ref 3.5–5.2)
PROT SERPL-MCNC: 6.5 G/DL (ref 6–8.5)
PROT UR QL STRIP: ABNORMAL
RBC # BLD AUTO: 4.15 10*6/MM3 (ref 3.77–5.28)
RBC # UR STRIP: ABNORMAL /HPF
REF LAB TEST METHOD: ABNORMAL
SODIUM SERPL-SCNC: 137 MMOL/L (ref 136–145)
SP GR UR STRIP: 1.02 (ref 1–1.03)
SQUAMOUS #/AREA URNS HPF: ABNORMAL /HPF
UROBILINOGEN UR QL STRIP: ABNORMAL
WBC # UR STRIP: ABNORMAL /HPF
WBC NRBC COR # BLD: 10 10*3/MM3 (ref 3.4–10.8)

## 2023-08-25 PROCEDURE — 80053 COMPREHEN METABOLIC PANEL: CPT | Performed by: PHYSICIAN ASSISTANT

## 2023-08-25 PROCEDURE — 81001 URINALYSIS AUTO W/SCOPE: CPT | Performed by: NURSE PRACTITIONER

## 2023-08-25 PROCEDURE — 85025 COMPLETE CBC W/AUTO DIFF WBC: CPT | Performed by: PHYSICIAN ASSISTANT

## 2023-08-25 PROCEDURE — 70551 MRI BRAIN STEM W/O DYE: CPT

## 2023-08-25 PROCEDURE — 99284 EMERGENCY DEPT VISIT MOD MDM: CPT

## 2023-08-25 RX ORDER — SODIUM CHLORIDE 0.9 % (FLUSH) 0.9 %
10 SYRINGE (ML) INJECTION AS NEEDED
Status: DISCONTINUED | OUTPATIENT
Start: 2023-08-25 | End: 2023-08-25 | Stop reason: HOSPADM

## 2023-08-25 RX ORDER — CEFDINIR 300 MG/1
300 CAPSULE ORAL 2 TIMES DAILY
Qty: 14 CAPSULE | Refills: 0 | Status: SHIPPED | OUTPATIENT
Start: 2023-08-25 | End: 2023-09-01

## 2023-08-25 NOTE — ED PROVIDER NOTES
Subjective     Provider in Triage Note  Patient is a 32 year old female who is a G1Po 20 weeks pregnant with history of IVF who presents with worsening vision changes in her left eye.  She started losing vision in her left eye was seen by her OB/GYN.  She was sent to an optometrist.  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    History of Present Illness  I interviewed the patient.  Patient reports that this began on Monday, worsened throughout the week today, she was seen by her optometrist, and her OB recommended being seen in the ED.  She denies a headache.  No facial droop or weakness.  No speech difficulty.  No numbness, tingling or weakness.  Denies any pregnancy related complaints, no abdominal pain, urinary symptoms, back pain, pelvic pain, vaginal bleeding or discharge  Review of Systems   Constitutional:  Negative for chills and fever.   Eyes:  Positive for visual disturbance. Negative for photophobia, pain, discharge, redness and itching.   Respiratory:  Negative for shortness of breath.    Cardiovascular:  Negative for chest pain.   Gastrointestinal:  Negative for abdominal pain.   Genitourinary:  Negative for difficulty urinating, dysuria, flank pain, frequency, pelvic pain, vaginal bleeding and vaginal discharge.   Musculoskeletal:  Negative for back pain and neck pain.   Skin:  Negative for color change and rash.   Neurological:  Negative for dizziness, tremors, seizures, syncope, facial asymmetry, speech difficulty, weakness, light-headedness, numbness and headaches.     Past Medical History:   Diagnosis Date    Allergic     History of medical problems Nov2021    PCOS-adrenal    HPV in female     PCOS (polycystic ovarian syndrome)        Allergies   Allergen Reactions    Sulfa Antibiotics Hives    Tramadol Itching     Grids her teeth, can not sleep and itching        Past Surgical History:   Procedure Laterality Date    HEAD/NECK LESION/CYST EXCISION Left     infancy       Family History   Problem Relation Age of Onset    Arthritis Mother         Hip & shoulder    Depression Mother     Thyroid disease Mother         Niece born with hypothyroidism as well    Arthritis Maternal Grandmother         Hands    Vision loss Maternal Grandmother         Macular degeneration    Diabetes Maternal Grandfather        Social History     Socioeconomic History    Marital status:    Tobacco Use    Smoking status: Former     Years: 8.00     Types: Cigarettes    Smokeless tobacco: Never    Tobacco comments:     Cessation 2015   Vaping Use    Vaping Use: Never used   Substance and Sexual Activity    Alcohol use: Yes     Alcohol/week: 1.0 standard drink     Types: 1 Cans of beer per week     Comment: At this time consumption is occasional    Drug use: Never    Sexual activity: Yes     Partners: Male     Birth control/protection: None     Comment: Trying to conceive           Objective   Physical Exam  Vitals and nursing note reviewed.   Constitutional:       Appearance: Normal appearance. She is not toxic-appearing.   HENT:      Head: Normocephalic and atraumatic.      Nose: Nose normal.      Mouth/Throat:      Mouth: Mucous membranes are moist.      Pharynx: Oropharynx is clear.   Eyes:      General: Lids are normal.      Extraocular Movements: Extraocular movements intact.      Conjunctiva/sclera: Conjunctivae normal.      Pupils: Pupils are equal, round, and reactive to light.      Visual Fields: Right eye visual fields normal.      Comments: She reports being able to see shadows in her visual fields in the left eye, but reports it is blurred.  Normal visual fields to the right   Neck:      Vascular: No carotid bruit.      Trachea: Trachea and phonation normal.   Cardiovascular:      Rate and Rhythm: Normal rate and regular rhythm.      Heart sounds: Normal heart sounds. No murmur  "heard.    No friction rub. No gallop.   Pulmonary:      Effort: Pulmonary effort is normal.      Breath sounds: Normal breath sounds.   Abdominal:      General: Bowel sounds are normal.      Palpations: Abdomen is soft.   Musculoskeletal:      Cervical back: Full passive range of motion without pain, normal range of motion and neck supple.   Skin:     General: Skin is warm and dry.      Capillary Refill: Capillary refill takes less than 2 seconds.   Neurological:      General: No focal deficit present.      Mental Status: She is alert and oriented to person, place, and time.      GCS: GCS eye subscore is 4. GCS verbal subscore is 5. GCS motor subscore is 6.      Cranial Nerves: Cranial nerves 2-12 are intact. No dysarthria or facial asymmetry.      Sensory: Sensation is intact.      Motor: Motor function is intact.      Coordination: Coordination is intact.      Gait: Gait is intact.       Procedures           ED Course  ED Course as of 08/26/23 0228   Fri Aug 25, 2023   1809 MRI called. Requested noncontrast MRI due to pregnancy  This was ordered [MG]   1943 FHT noted 154 per Feng.  [LB]   2059 Consult to Dr. Mckenzie office  Discussed with ed attending physician, Dr. Read [LB]      ED Course User Index  [LB] Jennifer Tillman APRN  [MG] Neli Gilbert PA-C           /70   Pulse 86   Temp 98.2 øF (36.8 øC) (Oral)   Resp 16   Ht 154.9 cm (61\")   Wt 66.6 kg (146 lb 13.2 oz)   LMP  (Exact Date)   SpO2 98%   BMI 27.74 kg/mý   Medications - No data to display  MRI Brain Without Contrast    Result Date: 8/25/2023  Impression: Examination appears within normal limits. Electronically Signed: Jv Esquivel MD  8/25/2023 6:50 PM EDT  Workstation ID: [id number]   Lab Results (last 24 hours)       Procedure Component Value Units Date/Time    CBC & Differential [[id number]]  (Abnormal) Collected: 08/25/23 1908    Specimen: Blood from Arm, Right Updated: 08/25/23 1919    Narrative:      The following " orders were created for panel order CBC & Differential.  Procedure                               Abnormality         Status                     ---------                               -----------         ------                     CBC Auto Differential[[id number]]        Abnormal            Final result                 Please view results for these tests on the individual orders.    Comprehensive Metabolic Panel [[id number]]  (Abnormal) Collected: 08/25/23 1908    Specimen: Blood from Arm, Right Updated: 08/25/23 1935     Glucose 84 mg/dL      BUN 8 mg/dL      Creatinine 0.53 mg/dL      Sodium 137 mmol/L      Potassium 4.2 mmol/L      Chloride 104 mmol/L      CO2 21.0 mmol/L      Calcium 8.8 mg/dL      Total Protein 6.5 g/dL      Albumin 3.8 g/dL      ALT (SGPT) 22 U/L      AST (SGOT) 18 U/L      Alkaline Phosphatase 79 U/L      Total Bilirubin 0.2 mg/dL      Globulin 2.7 gm/dL      A/G Ratio 1.4 g/dL      BUN/Creatinine Ratio 15.1     Anion Gap 12.0 mmol/L      eGFR 126.2 mL/min/1.73     Narrative:      GFR Normal >60  Chronic Kidney Disease <60  Kidney Failure <15      CBC Auto Differential [[id number]]  (Abnormal) Collected: 08/25/23 1908    Specimen: Blood from Arm, Right Updated: 08/25/23 1919     WBC 10.00 10*3/mm3      RBC 4.15 10*6/mm3      Hemoglobin 12.4 g/dL      Hematocrit 37.4 %      MCV 89.9 fL      MCH 29.9 pg      MCHC 33.3 g/dL      RDW 14.1 %      RDW-SD 45.9 fl      MPV 8.7 fL      Platelets 203 10*3/mm3      Neutrophil % 74.9 %      Lymphocyte % 18.1 %      Monocyte % 6.1 %      Eosinophil % 0.6 %      Basophil % 0.3 %      Neutrophils, Absolute 7.50 10*3/mm3      Lymphocytes, Absolute 1.80 10*3/mm3      Monocytes, Absolute 0.60 10*3/mm3      Eosinophils, Absolute 0.10 10*3/mm3      Basophils, Absolute 0.00 10*3/mm3      nRBC 0.0 /100 WBC     Urinalysis With Microscopic If Indicated (No Culture) - Urine, Clean Catch [[id number]]  (Abnormal) Collected: 08/25/23 2023    Specimen: Urine, Clean Catch  Updated: 08/25/23 2032     Color, UA Yellow     Appearance, UA Clear     pH, UA 6.0     Specific Gravity, UA 1.024     Glucose, UA Negative     Ketones, UA Trace     Bilirubin, UA Negative     Blood, UA Negative     Protein, UA Trace     Leuk Esterase, UA Moderate (2+)     Nitrite, UA Negative     Urobilinogen, UA 1.0 E.U./dL    Urinalysis, Microscopic Only - Urine, Clean Catch [[id number]]  (Abnormal) Collected: 08/25/23 2023    Specimen: Urine, Clean Catch Updated: 08/25/23 2032     RBC, UA 0-2 /HPF      WBC, UA Too Numerous to Count /HPF      Bacteria, UA 1+ /HPF      Squamous Epithelial Cells, UA 7-12 /HPF      Hyaline Casts, UA None Seen /LPF      Methodology Automated Microscopy                                          Medical Decision Making  Problems Addressed:  Bacteriuria in pregnancy: complicated acute illness or injury  Visual disturbance: complicated acute illness or injury    Amount and/or Complexity of Data Reviewed  Labs: ordered.  Radiology: ordered.    Risk  Prescription drug management.    Discussed with ED attending physicianJacek  Patient is a 32-year-old female presented to the ED with a complaint of visual change.  This been going on since Monday.  She had been seen by optometry, as well as her OB/GYN.  Was then referred to the ED.  Lab work reviewed as above.  Patient underwent MRI of the brain which reveals no acute findings.  I consulted with ophthalmology, spoke with Dr. Aleman, we discussed patient presentation, findings, telemetric findings as discussed in the triage, patient will be seen in their office on Wednesday as scheduled.  Patient has no other pregnancy related complaints today.  Fetal heart tones were noted.  She is noted to have bacteria in her urine with leukocytes, I will initiate antibiotics given her pregnancy.  Have advised her to continue follow-up as scheduled with ophthalmology, as well as her OB/GYN.  I spoke with the patient at the bedside regarding their plan of  care, discharge instruction,  prescriptions, as well as reasons to return to the emergency department.  We discussed test results at the bedside, including incidental abnormal labs, radiological findings, understands need and importance  for follow-up with primary care or specialist if indicated.  Patient verbalizes understanding and agrees to the treatment plan at this time.  Note Disclaimer: At [facility], we believe that sharing information builds trust and better relationships. You are receiving this note because you recently visited [facility]. It is possible you will see health information before a provider has talked with you about it. This kind of information can be easy to misunderstand. To help you fully understand what it means for your health, we urge you to discuss this note with your provider.Note dictated utilizing Dragon Dictation. Appropriate PPE worn during patient interactions.            Final diagnoses:   Visual disturbance   Bacteriuria in pregnancy       ED Disposition  ED Disposition       ED Disposition   Discharge    Condition   Stable    Comment   --               [facility] EMERGENCY DEPARTMENT  [address] [address] [phone]    As needed, If symptoms worsen    PATIENT CONNECTION - [place] [address]  [phone]  Schedule an appointment as soon as possible for a visit   Call for assistance with follow up with Primary care provider-call tomorrow.         Medication List        New Prescriptions      cefdinir 300 MG capsule  Commonly known as: OMNICEF  Take 1 capsule by mouth 2 (Two) Times a Day for 7 days.               Where to Get Your Medications        These medications were sent to [facility] [id number] - [facility] [place] - [address] AT [facility] - [phone] [facility] [phone] FX  [address] [place] [address]      Phone: [phone]   cefdinir 300 MG capsule            Jennifer Tillman,  APRN  08/26/23 0226

## 2023-08-26 NOTE — DISCHARGE INSTRUCTIONS
Continue your follow-up with Dr. Frazier's office on Wednesday  Follow up with PCP, call for an appointment in 1-2 days, if you do not have a primary care provider please use patient connection [phone] to help establish care  Follow up with any specialist as indicated and discussed  Return to the ED for new or worsening symptoms  Take any medications as prescribed

## 2023-10-26 PROCEDURE — 87635 SARS-COV-2 COVID-19 AMP PRB: CPT | Performed by: FAMILY MEDICINE

## 2023-10-26 PROCEDURE — 87081 CULTURE SCREEN ONLY: CPT | Performed by: FAMILY MEDICINE

## 2023-10-26 PROCEDURE — 87086 URINE CULTURE/COLONY COUNT: CPT | Performed by: FAMILY MEDICINE

## 2023-10-27 ENCOUNTER — LAB (OUTPATIENT)
Dept: LAB | Facility: HOSPITAL | Age: 33
End: 2023-10-27
Payer: COMMERCIAL

## 2023-10-27 ENCOUNTER — TELEPHONE (OUTPATIENT)
Dept: URGENT CARE | Facility: CLINIC | Age: 33
End: 2023-10-27
Payer: COMMERCIAL

## 2023-10-27 DIAGNOSIS — J03.90 EXUDATIVE TONSILLITIS: ICD-10-CM

## 2023-10-27 DIAGNOSIS — J02.9 PHARYNGITIS, UNSPECIFIED ETIOLOGY: Primary | ICD-10-CM

## 2023-10-27 DIAGNOSIS — J02.9 PHARYNGITIS, UNSPECIFIED ETIOLOGY: ICD-10-CM

## 2023-10-27 LAB
BASOPHILS # BLD AUTO: 0 10*3/MM3 (ref 0–0.2)
BASOPHILS NFR BLD AUTO: 0.2 % (ref 0–1.5)
DEPRECATED RDW RBC AUTO: 47.7 FL (ref 37–54)
EOSINOPHIL # BLD AUTO: 0 10*3/MM3 (ref 0–0.4)
EOSINOPHIL NFR BLD AUTO: 0.1 % (ref 0.3–6.2)
ERYTHROCYTE [DISTWIDTH] IN BLOOD BY AUTOMATED COUNT: 14.8 % (ref 12.3–15.4)
HCT VFR BLD AUTO: 34.7 % (ref 34–46.6)
HGB BLD-MCNC: 11.3 G/DL (ref 12–15.9)
LYMPHOCYTES # BLD AUTO: 0.8 10*3/MM3 (ref 0.7–3.1)
LYMPHOCYTES NFR BLD AUTO: 5.5 % (ref 19.6–45.3)
MCH RBC QN AUTO: 30.6 PG (ref 26.6–33)
MCHC RBC AUTO-ENTMCNC: 32.6 G/DL (ref 31.5–35.7)
MCV RBC AUTO: 93.8 FL (ref 79–97)
MONOCYTES # BLD AUTO: 0.8 10*3/MM3 (ref 0.1–0.9)
MONOCYTES NFR BLD AUTO: 5.6 % (ref 5–12)
NEUTROPHILS NFR BLD AUTO: 12.7 10*3/MM3 (ref 1.7–7)
NEUTROPHILS NFR BLD AUTO: 88.6 % (ref 42.7–76)
NRBC BLD AUTO-RTO: 0 /100 WBC (ref 0–0.2)
PLATELET # BLD AUTO: 181 10*3/MM3 (ref 140–450)
PMV BLD AUTO: 8.5 FL (ref 6–12)
RBC # BLD AUTO: 3.7 10*6/MM3 (ref 3.77–5.28)
WBC NRBC COR # BLD: 14.3 10*3/MM3 (ref 3.4–10.8)

## 2023-10-27 PROCEDURE — 86308 HETEROPHILE ANTIBODY SCREEN: CPT

## 2023-10-27 PROCEDURE — 85025 COMPLETE CBC W/AUTO DIFF WBC: CPT

## 2023-10-27 PROCEDURE — 36415 COLL VENOUS BLD VENIPUNCTURE: CPT

## 2023-10-27 NOTE — TELEPHONE ENCOUNTER
Patient called and still has white patches to back of throat.     Testing came back negative for strep including throat culture.     Send down to [facility] to have mono checked to ensure negative. If negative, will start abx therapy.

## 2023-10-28 LAB — HETEROPH AB SER QL LA: NEGATIVE

## 2025-02-14 ENCOUNTER — TELEPHONE (OUTPATIENT)
Dept: FAMILY MEDICINE CLINIC | Facility: CLINIC | Age: 35
End: 2025-02-14

## 2025-02-14 NOTE — TELEPHONE ENCOUNTER
Caller: Carmelita Daniel    Relationship: Self    Best call back number: [phone]     PATIENT IS SCHEDULED WITH VANDANA CHERY FOR HER [facility]YSICAL DUE TO DR. KACEY DAVIES'S SCHEDULE.

## 2025-02-27 ENCOUNTER — OFFICE VISIT (OUTPATIENT)
Dept: FAMILY MEDICINE CLINIC | Facility: CLINIC | Age: 35
End: 2025-02-27
Payer: COMMERCIAL

## 2025-02-27 ENCOUNTER — LAB (OUTPATIENT)
Dept: FAMILY MEDICINE CLINIC | Facility: CLINIC | Age: 35
End: 2025-02-27
Payer: COMMERCIAL

## 2025-02-27 VITALS
HEART RATE: 62 BPM | DIASTOLIC BLOOD PRESSURE: 67 MMHG | OXYGEN SATURATION: 100 % | HEIGHT: 61 IN | BODY MASS INDEX: 23.41 KG/M2 | SYSTOLIC BLOOD PRESSURE: 94 MMHG | TEMPERATURE: 97.7 F | WEIGHT: 124 LBS

## 2025-02-27 DIAGNOSIS — Z00.00 HEALTH MAINTENANCE EXAMINATION: ICD-10-CM

## 2025-02-27 DIAGNOSIS — E03.9 HYPOTHYROIDISM, UNSPECIFIED TYPE: ICD-10-CM

## 2025-02-27 DIAGNOSIS — F33.1 MAJOR DEPRESSIVE DISORDER, RECURRENT EPISODE, MODERATE DEGREE: ICD-10-CM

## 2025-02-27 DIAGNOSIS — Z83.438 FAMILY HISTORY OF FAMILIAL COMBINED HYPERLIPIDEMIA: ICD-10-CM

## 2025-02-27 DIAGNOSIS — Z11.59 ENCOUNTER FOR HEPATITIS C SCREENING TEST FOR LOW RISK PATIENT: ICD-10-CM

## 2025-02-27 DIAGNOSIS — Z11.3 SCREEN FOR STD (SEXUALLY TRANSMITTED DISEASE): ICD-10-CM

## 2025-02-27 DIAGNOSIS — Z00.00 HEALTH MAINTENANCE EXAMINATION: Primary | ICD-10-CM

## 2025-02-27 PROBLEM — H46.9 OPTIC NEURITIS: Status: ACTIVE | Noted: 2023-08-28

## 2025-02-27 PROBLEM — H46.02: Status: ACTIVE | Noted: 2023-09-19

## 2025-02-27 LAB
ALBUMIN SERPL-MCNC: 4.2 G/DL (ref 3.5–5.2)
ALBUMIN/GLOB SERPL: 1.5 G/DL
ALP SERPL-CCNC: 98 U/L (ref 39–117)
ALT SERPL W P-5'-P-CCNC: 11 U/L (ref 1–33)
ANION GAP SERPL CALCULATED.3IONS-SCNC: 9.1 MMOL/L (ref 5–15)
AST SERPL-CCNC: 16 U/L (ref 1–32)
BASOPHILS # BLD AUTO: 0.04 10*3/MM3 (ref 0–0.2)
BASOPHILS NFR BLD AUTO: 0.8 % (ref 0–1.5)
BILIRUB SERPL-MCNC: 0.4 MG/DL (ref 0–1.2)
BUN SERPL-MCNC: 11 MG/DL (ref 6–20)
BUN/CREAT SERPL: 14.3 (ref 7–25)
C TRACH RRNA CVX QL NAA+PROBE: NOT DETECTED
CALCIUM SPEC-SCNC: 9.3 MG/DL (ref 8.6–10.5)
CHLORIDE SERPL-SCNC: 105 MMOL/L (ref 98–107)
CHOLEST SERPL-MCNC: 121 MG/DL (ref 0–200)
CO2 SERPL-SCNC: 23.9 MMOL/L (ref 22–29)
CREAT SERPL-MCNC: 0.77 MG/DL (ref 0.57–1)
DEPRECATED RDW RBC AUTO: 41.2 FL (ref 37–54)
EGFRCR SERPLBLD CKD-EPI 2021: 104 ML/MIN/1.73
EOSINOPHIL # BLD AUTO: 0.06 10*3/MM3 (ref 0–0.4)
EOSINOPHIL NFR BLD AUTO: 1.2 % (ref 0.3–6.2)
ERYTHROCYTE [DISTWIDTH] IN BLOOD BY AUTOMATED COUNT: 12.6 % (ref 12.3–15.4)
GLOBULIN UR ELPH-MCNC: 2.8 GM/DL
GLUCOSE SERPL-MCNC: 84 MG/DL (ref 65–99)
HAV IGM SERPL QL IA: NORMAL
HBV CORE IGM SERPL QL IA: NORMAL
HBV SURFACE AG SERPL QL IA: NORMAL
HCT VFR BLD AUTO: 40.9 % (ref 34–46.6)
HCV AB SER QL: NORMAL
HDLC SERPL-MCNC: 51 MG/DL (ref 40–60)
HGB BLD-MCNC: 13.4 G/DL (ref 12–15.9)
HIV 1+2 AB+HIV1 P24 AG SERPL QL IA: NORMAL
IMM GRANULOCYTES # BLD AUTO: 0.01 10*3/MM3 (ref 0–0.05)
IMM GRANULOCYTES NFR BLD AUTO: 0.2 % (ref 0–0.5)
LDLC SERPL CALC-MCNC: 60 MG/DL (ref 0–100)
LDLC/HDLC SERPL: 1.21 {RATIO}
LYMPHOCYTES # BLD AUTO: 1.61 10*3/MM3 (ref 0.7–3.1)
LYMPHOCYTES NFR BLD AUTO: 31.3 % (ref 19.6–45.3)
MCH RBC QN AUTO: 29.3 PG (ref 26.6–33)
MCHC RBC AUTO-ENTMCNC: 32.8 G/DL (ref 31.5–35.7)
MCV RBC AUTO: 89.3 FL (ref 79–97)
MONOCYTES # BLD AUTO: 0.32 10*3/MM3 (ref 0.1–0.9)
MONOCYTES NFR BLD AUTO: 6.2 % (ref 5–12)
N GONORRHOEA RRNA SPEC QL NAA+PROBE: NOT DETECTED
NEUTROPHILS NFR BLD AUTO: 3.11 10*3/MM3 (ref 1.7–7)
NEUTROPHILS NFR BLD AUTO: 60.3 % (ref 42.7–76)
NRBC BLD AUTO-RTO: 0 /100 WBC (ref 0–0.2)
PLATELET # BLD AUTO: 238 10*3/MM3 (ref 140–450)
PMV BLD AUTO: 10.7 FL (ref 6–12)
POTASSIUM SERPL-SCNC: 4.7 MMOL/L (ref 3.5–5.2)
PROT SERPL-MCNC: 7 G/DL (ref 6–8.5)
RBC # BLD AUTO: 4.58 10*6/MM3 (ref 3.77–5.28)
RPR SER QL: NORMAL
SODIUM SERPL-SCNC: 138 MMOL/L (ref 136–145)
TRIGL SERPL-MCNC: 41 MG/DL (ref 0–150)
TSH SERPL DL<=0.05 MIU/L-ACNC: 1.35 UIU/ML (ref 0.27–4.2)
VLDLC SERPL-MCNC: 10 MG/DL (ref 5–40)
WBC NRBC COR # BLD AUTO: 5.15 10*3/MM3 (ref 3.4–10.8)

## 2025-02-27 PROCEDURE — 36415 COLL VENOUS BLD VENIPUNCTURE: CPT

## 2025-02-27 PROCEDURE — 86696 HERPES SIMPLEX TYPE 2 TEST: CPT

## 2025-02-27 PROCEDURE — 80061 LIPID PANEL: CPT

## 2025-02-27 PROCEDURE — 86592 SYPHILIS TEST NON-TREP QUAL: CPT

## 2025-02-27 PROCEDURE — 99395 PREV VISIT EST AGE 18-39: CPT

## 2025-02-27 PROCEDURE — 80050 GENERAL HEALTH PANEL: CPT

## 2025-02-27 PROCEDURE — 80074 ACUTE HEPATITIS PANEL: CPT

## 2025-02-27 PROCEDURE — G0432 EIA HIV-1/HIV-2 SCREEN: HCPCS

## 2025-02-27 PROCEDURE — 87591 N.GONORRHOEAE DNA AMP PROB: CPT

## 2025-02-27 PROCEDURE — 87491 CHLMYD TRACH DNA AMP PROBE: CPT

## 2025-02-27 PROCEDURE — 86695 HERPES SIMPLEX TYPE 1 TEST: CPT

## 2025-02-27 RX ORDER — LEVOTHYROXINE SODIUM 50 UG/1
50 TABLET ORAL
Start: 2025-02-27

## 2025-02-27 NOTE — PROGRESS NOTES
"Chief Complaint  Annual Exam (No problems per pt, requests labs, genesight)    Subjective        Carmelita Daniel presents to [facility]  History of Present Illness    Pt presents today for her annual physical.    She found out in November about her  having extra-marital affairs. It has been a very stressful few months. She would like to be screened for STDs.    Her son Ye just turned [age]. He is keeping her busy.    She is interested in [employer] but hasn't been on any medications in the past. She has been through a lot in the past few months.     She is taking 50 mcg synthroid through fertility endocrinologist.    She is also concerned about familial hyperlipidemia. Her brother and father are both on statins.    She feels well otherwise.    Objective   Vital Signs:  BP 94/67   Pulse 62   Temp 97.7 °F (36.5 °C) (Temporal)   Ht 154.9 cm (60.98\")   Wt 56.2 kg (124 lb)   SpO2 100%   BMI 23.44 kg/m²   Estimated body mass index is 23.44 kg/m² as calculated from the following:    Height as of this encounter: 154.9 cm (60.98\").    Weight as of this encounter: 56.2 kg (124 lb).    BMI is within normal parameters. No other follow-up for BMI required.            Physical Exam  Vitals reviewed.   Constitutional:       General: She is not in acute distress.     Appearance: Normal appearance. She is not ill-appearing, toxic-appearing or diaphoretic.   HENT:      Head: Normocephalic and atraumatic.   Eyes:      Conjunctiva/sclera: Conjunctivae normal.      Pupils: Pupils are equal, round, and reactive to light.   Cardiovascular:      Rate and Rhythm: Normal rate and regular rhythm.      Pulses: Normal pulses.      Heart sounds: Normal heart sounds.   Pulmonary:      Effort: Pulmonary effort is normal. No respiratory distress.      Breath sounds: Normal breath sounds.   Abdominal:      General: Bowel sounds are normal. There is no distension.      Palpations: Abdomen is soft. " There is no mass.      Tenderness: There is no abdominal tenderness. There is no right CVA tenderness, left CVA tenderness, guarding or rebound.      Hernia: No hernia is present.   Musculoskeletal:         General: Normal range of motion.      Cervical back: Normal range of motion and neck supple.      Right lower leg: No edema.      Left lower leg: No edema.   Lymphadenopathy:      Cervical: No cervical adenopathy.   Skin:     General: Skin is warm and dry.      Capillary Refill: Capillary refill takes less than 2 seconds.      Findings: No rash.   Neurological:      General: No focal deficit present.      Mental Status: She is alert and oriented to person, place, and time.   Psychiatric:         Mood and Affect: Mood normal.         Behavior: Behavior normal.         Thought Content: Thought content normal.         Judgment: Judgment normal.        Result Review :                Assessment and Plan   Diagnoses and all orders for this visit:    1. Health maintenance examination (Primary)  -     Comprehensive metabolic panel; Future  -     CBC w AUTO Differential; Future    2. Hypothyroidism, unspecified type  -     TSH; Future    3. Major depressive disorder, recurrent episode, moderate degree  Comments:  wants a [employer] is looking to see if insurance will cover  pt is seeing a therapist    4. Screen for STD (sexually transmitted disease)  -     HIV-1/O/2 ANTIGEN/ANTIBODY, 4TH GENERATION; Future  -     HSV 1 and 2-Specific Ab, IgG; Future  -     RPR; Future  -     Hepatitis panel, acute; Future  -     Chlamydia trachomatis, Neisseria gonorrhoeae, PCR - , Urine, Clean Catch; Future    5. Family history of familial combined hyperlipidemia  -     Lipid panel; Future    6. Encounter for hepatitis C screening test for low risk patient  -     Hepatitis C antibody; Future    Other orders  -     levothyroxine (SYNTHROID, LEVOTHROID) 50 MCG tablet; Take 1 tablet by mouth Every Morning.             Follow Up   No  follow-ups on file.  Patient was given instructions and counseling regarding her condition or for health maintenance advice. Please see specific information pulled into the AVS if appropriate.

## 2025-02-28 LAB
HSV1 IGG SERPL QL IA: NON REACTIVE
HSV2 IGG SERPL QL IA: NON REACTIVE